# Patient Record
Sex: MALE | Race: WHITE | NOT HISPANIC OR LATINO | Employment: FULL TIME | URBAN - METROPOLITAN AREA
[De-identification: names, ages, dates, MRNs, and addresses within clinical notes are randomized per-mention and may not be internally consistent; named-entity substitution may affect disease eponyms.]

---

## 2021-08-24 ENCOUNTER — TELEMEDICINE (OUTPATIENT)
Dept: FAMILY MEDICINE CLINIC | Facility: TELEHEALTH | Age: 31
End: 2021-08-24

## 2021-08-24 ENCOUNTER — LAB (OUTPATIENT)
Dept: LAB | Facility: HOSPITAL | Age: 31
End: 2021-08-24

## 2021-08-24 DIAGNOSIS — J06.9 ACUTE URI: ICD-10-CM

## 2021-08-24 DIAGNOSIS — J06.9 ACUTE URI: Primary | ICD-10-CM

## 2021-08-24 PROCEDURE — 99203 OFFICE O/P NEW LOW 30 MIN: CPT | Performed by: NURSE PRACTITIONER

## 2021-08-24 PROCEDURE — U0004 COV-19 TEST NON-CDC HGH THRU: HCPCS | Performed by: NURSE PRACTITIONER

## 2021-08-24 PROCEDURE — C9803 HOPD COVID-19 SPEC COLLECT: HCPCS

## 2021-08-24 NOTE — PROGRESS NOTES
Orlando Ruth  1990    Chief Complaint   Patient presents with   • Nasal Congestion   • Headache   • Fatigue       HPI  Orlando Ruth is a 30 y.o. male with complaints of nasal congestion, fatigue and headache since Sunday. He works at a [employer] and is requesting Covid testing. He has been fully vaccinated.    The following portions of the patient's history were reviewed and updated as appropriate: allergies, current medications, past social history and problem list.    Past Medical History:   Diagnosis Date   • Allergic      Social History     Socioeconomic History   • Marital status:      Spouse name: Not on file   • Number of children: Not on file   • Years of education: Not on file   • Highest education level: Not on file   Tobacco Use   • Smoking status: Never Smoker       REVIEW OF SYSTEMS  History obtained from the patient  General ROS: positive for  - chills and fatigue  ENT ROS: positive for - nasal congestion  Allergy and Immunology ROS: positive for - postnasal drip and seasonal allergies  Respiratory ROS: positive for - cough  negative for - shortness of breath or wheezing    PHYSICAL EXAM  There were no vitals taken for this visit.    CONSTITUTIONAL:alert, well appearing, and in no distress  CHEST:respiratory effort normal  PSYCH:alert, normal affect and speech     Diagnoses and all orders for this visit:    1. Acute URI (Primary)  -     COVID-19,CEPHEID/AURELIA/BDMAX,COR/CATHERINE/PAD/SOPHIE IN-HOUSE(OR EMERGENT/ADD-ON),NP SWAB IN TRANSPORT MEDIA 3-4 HR TAT, RT-PCR - Swab, Nasopharynx; Future  -     QUESTIONNAIRE SERIES      No Pharmacies Listed    This visit was performed via Telehealth.  Order has been placed for SARS-CoV-2 (Coronavirus) test to be done at [facility]. Please call the Urgent Care when you arrive and let them know that you have an order for tesing. We will call with results when they are available. The phone call will come from a blocked number. A K[employer] message will also  be sent after the first attempted call to notify you that your test results are available and the results will be released on en-Gauge for you to review. Continue to treat your symptoms just as you would for any viral illness. Take Tylenol and/or Ibuprofen for pain and/or fever, you may find it better to alternate these every 4 hours, so that you are only taking each every 8 hours. Stay hydrated, rest and you may treat cough with over-the-counter cough syrup such as Robitussin. You will need to Self Quarantine (instructions are being sent to en-Gauge) until the following criteria has been met:  --it has been 10 days from onset of symptoms  --minimum of 24 hours fever free without fever reducing medication  --AND improvement of symptoms  Continue to wear a mask for 14 days or until symptoms resolve. If symptoms worsen, go to Urgent Care or Emergency Department for care.        Christa Yan, APRN  08/24/21  11:31 EDT

## 2021-08-24 NOTE — PATIENT INSTRUCTIONS
Order has been placed for SARS-CoV-2 (Coronavirus) test to be done at [facility]. Please call the Urgent Care when you arrive and let them know that you have an order for tesing. We will call with results when they are available. The phone call will come from a blocked number. A Livevol message will also be sent after the first attempted call to notify you that your test results are available and the results will be released on Livevol for you to review. Continue to treat your symptoms just as you would for any viral illness. Take Tylenol and/or Ibuprofen for pain and/or fever, you may find it better to alternate these every 4 hours, so that you are only taking each every 8 hours. Stay hydrated, rest and you may treat cough with over-the-counter cough syrup such as Robitussin. You will need to Self Quarantine (instructions are being sent to Livevol) until the following criteria has been met:  --it has been 10 days from onset of symptoms  --minimum of 24 hours fever free without fever reducing medication  --AND improvement of symptoms  Continue to wear a mask for 14 days or until symptoms resolve. If symptoms worsen, go to Urgent Care or Emergency Department for care.      3 Key Steps to Take While Waiting for Your COVID-19 Test Result  To help stop the spread of COVID-19, take these 3 key steps NOW while waiting for your test results:  1. Stay home and monitor your health.  Stay home and monitor your health to help protect your friends, family, and others from possibly getting COVID-19 from you.  Stay home and away from others:  · If possible, stay away from others, especially people who are at higher risk for getting very sick from COVID-19, such as older adults and people with other medical conditions.  · If you have been in contact with someone with COVID-19, stay home and away from others for 14 days after your last contact with that person.  · If you have a fever, cough or other symptoms of COVID-19,  stay home and away from others (except to get medical care).  Monitor your health:  · Watch for fever, cough, shortness of breath, or other symptoms of COVID-19. Remember, symptoms may appear 2-14 days after exposure to COVID-19 and can include:  ? Fever or chills  ? Cough  ? Shortness of breath or difficulty breathing  ? Tiredness  ? Muscle or body aches  ? Headache  ? New loss of taste or smell  ? Sore throat  ? Congestion or runny nose  ? Nausea or vomiting  ? Diarrhea  2. Think about the people you have recently been around.  If you are diagnosed with COVID-19, a public health worker may call you to check on your health, discuss who you have been around, and ask where you spent time while you may have been able to spread COVID-19 to others. While you wait for your COVID-19 test result, think about everyone you have been around recently. This will be important information to give health workers if your test is positive.   Complete the information on the back of this page to help you remember everyone you have been around.  3. Answer the phone call from the health department.  If a public health worker calls you, answer the call to help slow the spread of COVID-19 in your community.  · Discussions with health department staff are confidential. This means that your personal and medical information will be kept private and only shared with those who may need to know, like your health care provider.  · Your name will not be shared with those you came in contact with. The health department will only notify people you were in close contact with (within 6 feet for more than 15 minutes) that they might have been exposed to COVID-19.  Think about the people you have recently been around  If you test positive and are diagnosed with COVID-19, someone from the health department may call to check-in on your health, discuss who you have been around, and ask where you spent time while you may have been able to spread COVID-19  to others. This form can help you think about people you have recently been around so you will be ready if a public health worker calls you.  Things to think about. Have you:  · Gone to work or school?  · Gotten together with others (eaten out at a restaurant, gone out for drinks, exercised with others or gone to a gym, had friends or family over to your house, volunteered, gone to a party, pool, or park)?  · Gone to a store in person (e.g., grocery store, mall)?  · Gone to in-person appointments (e.g., salon, norris, doctor's or dentist's office)?  · Ridden in a car with others (e.g., Uber or Lyft) or took public transportation?  · Been inside a Congregation, [employer], Temple or other places of [employer]?  Who lives with you?  · ______________________________________________________________________  · ______________________________________________________________________  · ______________________________________________________________________  · ______________________________________________________________________  Who have you been around (within 6 feet for more than 15 minutes) in the last 10 days? (You may have more people to list than the space provided. If so, write on the front of this sheet or a separate piece of paper.)  Name ______________________________________________  · Phone number ____________________________________  · Date you last saw them _____________________________  · Where you last saw them ________________________________________________  Name ______________________________________________  · Phone number ____________________________________  · Date you last saw them _____________________________  · Where you last saw them ________________________________________________  Name ______________________________________________  · Phone number ____________________________________  · Date you last saw them _____________________________  · Where you last saw them  ________________________________________________  Name ______________________________________________  · Phone number ____________________________________  · Date you last saw them _____________________________  · Where you last saw them ________________________________________________  Name ______________________________________________  · Phone number ____________________________________  · Date you last saw them _____________________________  · Where you last saw them ________________________________________________  What have you done in the last 10 days with other people?  Activity _____________________________________________  · Location _________________________________________  · Date ____________________________________________  Activity _____________________________________________  · Location _________________________________________  · Date ____________________________________________  Activity _____________________________________________  · Location _________________________________________  · Date ____________________________________________  Activity _____________________________________________  · Location _________________________________________  · Date ____________________________________________  Activity _____________________________________________  · Location _________________________________________  · Date ____________________________________________  cdc.gov/coronavirus  07/27/2020  This information is not intended to replace advice given to you by your health care provider. Make sure you discuss any questions you have with your health care provider.  Document Revised: 01/19/2021 Document Reviewed: 12/03/2020  [employer]vier Patient Education © 2021 [employer]

## 2021-08-25 LAB — SARS-COV-2 RNA PNL SPEC NAA+PROBE: DETECTED

## 2021-11-11 ENCOUNTER — IMMUNIZATION (OUTPATIENT)
Dept: VACCINE CLINIC | Facility: HOSPITAL | Age: 31
End: 2021-11-11

## 2021-11-11 PROCEDURE — 0004A ADM SARSCOV2 30MCG/0.3ML BOOSTER: CPT | Performed by: INTERNAL MEDICINE

## 2021-11-11 PROCEDURE — 91300 HC SARSCOV02 VAC 30MCG/0.3ML IM: CPT | Performed by: INTERNAL MEDICINE
